# Patient Record
Sex: FEMALE | Race: OTHER | HISPANIC OR LATINO
[De-identification: names, ages, dates, MRNs, and addresses within clinical notes are randomized per-mention and may not be internally consistent; named-entity substitution may affect disease eponyms.]

---

## 2021-12-03 PROBLEM — Z00.00 ENCOUNTER FOR PREVENTIVE HEALTH EXAMINATION: Status: ACTIVE | Noted: 2021-12-03

## 2021-12-07 ENCOUNTER — APPOINTMENT (OUTPATIENT)
Dept: NEUROLOGY | Facility: CLINIC | Age: 76
End: 2021-12-07
Payer: MEDICARE

## 2021-12-07 VITALS
WEIGHT: 137 LBS | HEIGHT: 64 IN | DIASTOLIC BLOOD PRESSURE: 60 MMHG | SYSTOLIC BLOOD PRESSURE: 190 MMHG | BODY MASS INDEX: 23.39 KG/M2

## 2021-12-07 DIAGNOSIS — Z86.79 PERSONAL HISTORY OF OTHER DISEASES OF THE CIRCULATORY SYSTEM: ICD-10-CM

## 2021-12-07 DIAGNOSIS — Z72.89 OTHER PROBLEMS RELATED TO LIFESTYLE: ICD-10-CM

## 2021-12-07 DIAGNOSIS — Z86.39 PERSONAL HISTORY OF OTHER ENDOCRINE, NUTRITIONAL AND METABOLIC DISEASE: ICD-10-CM

## 2021-12-07 PROCEDURE — 99204 OFFICE O/P NEW MOD 45 MIN: CPT

## 2021-12-07 RX ORDER — QUINAPRIL HYDROCHLORIDE 20 MG/1
20 TABLET, FILM COATED ORAL
Refills: 0 | Status: COMPLETED | COMMUNITY

## 2021-12-07 RX ORDER — ATORVASTATIN CALCIUM 10 MG/1
10 TABLET, FILM COATED ORAL
Refills: 0 | Status: COMPLETED | COMMUNITY

## 2021-12-07 RX ORDER — ATORVASTATIN CALCIUM 10 MG/1
10 TABLET, FILM COATED ORAL
Refills: 0 | Status: ACTIVE | COMMUNITY

## 2021-12-07 RX ORDER — QUINAPRIL HYDROCHLORIDE 10 MG/1
10 TABLET, FILM COATED ORAL
Refills: 0 | Status: ACTIVE | COMMUNITY

## 2021-12-07 NOTE — CONSULT LETTER
[Dear  ___] : Dear  [unfilled], [Courtesy Letter:] : I had the pleasure of seeing your patient, [unfilled], in my office today. [Please see my note below.] : Please see my note below. [Consult Closing:] : Thank you very much for allowing me to participate in the care of this patient.  If you have any questions, please do not hesitate to contact me. [Sincerely,] : Sincerely, [FreeTextEntry3] : Drew Amaral MD.

## 2021-12-07 NOTE — HISTORY OF PRESENT ILLNESS
[FreeTextEntry1] : I saw this patient in the office today.\par She presents with her .\par \par On routine physical examination done by a visiting nurse, a cognitive screen demonstrated some memory difficulty. She was advised to follow with her primary doctor and then was referred here.\par Her  reports that he first noticed some cognitive difficulties in February of 2021.\par It is predominantly for short-term memory.

## 2021-12-07 NOTE — PHYSICAL EXAM
[General Appearance - Alert] : alert [General Appearance - In No Acute Distress] : in no acute distress [Affect] : the affect was normal [Memory Remote] : remote memory was not impaired [Cranial Nerves Optic (II)] : visual acuity intact bilaterally,  visual fields full to confrontation, pupils equal round and reactive to light [Cranial Nerves Oculomotor (III)] : extraocular motion intact [Cranial Nerves Trigeminal (V)] : facial sensation intact symmetrically [Cranial Nerves Facial (VII)] : face symmetrical [Cranial Nerves Vestibulocochlear (VIII)] : hearing was intact bilaterally [Cranial Nerves Glossopharyngeal (IX)] : tongue and palate midline [Cranial Nerves Accessory (XI - Cranial And Spinal)] : head turning and shoulder shrug symmetric [Cranial Nerves Hypoglossal (XII)] : there was no tongue deviation with protrusion [Motor Tone] : muscle tone was normal in all four extremities [Motor Strength] : muscle strength was normal in all four extremities [Sensation Tactile Decrease] : light touch was intact [Sensation Pain / Temperature Decrease] : pain and temperature was intact [Sensation Vibration Decrease] : vibration was intact [Abnormal Walk] : normal gait [2+] : Ankle jerk left 2+ [Optic Disc Abnormality] : the optic disc were normal in size and color [Total Score ___ / 30] : the patient achieved a score of [unfilled] /30 [Date / Time ___ / 5] : date / time [unfilled] / 5 [Place ___ / 5] : place [unfilled] / 5 [Registration ___ / 3] : registration [unfilled] / 3 [Serial Sevens ___/5] : serial sevens [unfilled] / 5 [Naming 2 Objects ___ / 2] : naming two objects [unfilled] / 2 [Repeating a Sentence ___ / 1] : repeating a sentence [unfilled] / 1 [Writing a Sentence ___ / 1] : write sentence [unfilled] / 1 [3-stage Verbal Command ___ / 3] : three-stage verbal command [unfilled] / 3 [Written Command ___ / 1] : written command [unfilled] / 1 [Copy a Design ___ / 1] : copy a design [unfilled] / 1 [Recall ___ / 3] : recall [unfilled] / 3 [Dysarthria] : no dysarthria [Aphasia] : no dysphasia/aphasia [Romberg's Sign] : Romberg's sign was negtive [Coordination - Dysmetria Impaired Finger-to-Nose Bilateral] : not present [Plantar Reflex Right Only] : normal on the right [Plantar Reflex Left Only] : normal on the left

## 2021-12-07 NOTE — ASSESSMENT
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

## 2021-12-09 ENCOUNTER — APPOINTMENT (OUTPATIENT)
Dept: NEUROLOGY | Facility: CLINIC | Age: 76
End: 2021-12-09
Payer: MEDICARE

## 2021-12-09 ENCOUNTER — NON-APPOINTMENT (OUTPATIENT)
Age: 76
End: 2021-12-09

## 2021-12-09 PROCEDURE — 95819 EEG AWAKE AND ASLEEP: CPT

## 2021-12-09 PROCEDURE — 93040 RHYTHM ECG WITH REPORT: CPT

## 2021-12-21 ENCOUNTER — APPOINTMENT (OUTPATIENT)
Dept: MRI IMAGING | Facility: CLINIC | Age: 76
End: 2021-12-21
Payer: MEDICARE

## 2021-12-21 ENCOUNTER — NON-APPOINTMENT (OUTPATIENT)
Age: 76
End: 2021-12-21

## 2021-12-21 ENCOUNTER — OUTPATIENT (OUTPATIENT)
Dept: OUTPATIENT SERVICES | Facility: HOSPITAL | Age: 76
LOS: 1 days | End: 2021-12-21

## 2021-12-21 DIAGNOSIS — G30.9 ALZHEIMER'S DISEASE, UNSPECIFIED: ICD-10-CM

## 2021-12-21 PROCEDURE — 70551 MRI BRAIN STEM W/O DYE: CPT | Mod: 26

## 2022-01-01 ENCOUNTER — EMERGENCY (EMERGENCY)
Facility: HOSPITAL | Age: 77
LOS: 1 days | End: 2022-01-01
Attending: EMERGENCY MEDICINE
Payer: MEDICARE

## 2022-01-01 VITALS
RESPIRATION RATE: 20 BRPM | OXYGEN SATURATION: 100 % | SYSTOLIC BLOOD PRESSURE: 170 MMHG | HEART RATE: 77 BPM | TEMPERATURE: 98 F | DIASTOLIC BLOOD PRESSURE: 90 MMHG

## 2022-01-01 DIAGNOSIS — R42 DIZZINESS AND GIDDINESS: ICD-10-CM

## 2022-01-01 DIAGNOSIS — I10 ESSENTIAL (PRIMARY) HYPERTENSION: ICD-10-CM

## 2022-01-01 DIAGNOSIS — I16.0 HYPERTENSIVE URGENCY: ICD-10-CM

## 2022-01-01 LAB
ALBUMIN SERPL ELPH-MCNC: 4.2 G/DL — SIGNIFICANT CHANGE UP (ref 3.3–5.2)
ALP SERPL-CCNC: 98 U/L — SIGNIFICANT CHANGE UP (ref 40–120)
ALT FLD-CCNC: 38 U/L — HIGH
ANION GAP SERPL CALC-SCNC: 23 MMOL/L — HIGH (ref 5–17)
APPEARANCE UR: CLEAR — SIGNIFICANT CHANGE UP
AST SERPL-CCNC: 40 U/L — HIGH
BASE EXCESS BLDV CALC-SCNC: 0.4 MMOL/L — SIGNIFICANT CHANGE UP (ref -2–3)
BASOPHILS # BLD AUTO: 0.08 K/UL — SIGNIFICANT CHANGE UP (ref 0–0.2)
BASOPHILS NFR BLD AUTO: 0.9 % — SIGNIFICANT CHANGE UP (ref 0–2)
BILIRUB SERPL-MCNC: 0.6 MG/DL — SIGNIFICANT CHANGE UP (ref 0.4–2)
BILIRUB UR-MCNC: NEGATIVE — SIGNIFICANT CHANGE UP
BUN SERPL-MCNC: 11 MG/DL — SIGNIFICANT CHANGE UP (ref 8–20)
CA-I SERPL-SCNC: 1.07 MMOL/L — LOW (ref 1.15–1.33)
CALCIUM SERPL-MCNC: 9.1 MG/DL — SIGNIFICANT CHANGE UP (ref 8.6–10.2)
CHLORIDE BLDV-SCNC: 104 MMOL/L — SIGNIFICANT CHANGE UP (ref 98–107)
CHLORIDE SERPL-SCNC: 101 MMOL/L — SIGNIFICANT CHANGE UP (ref 98–107)
CO2 SERPL-SCNC: 17 MMOL/L — LOW (ref 22–29)
COLOR SPEC: YELLOW — SIGNIFICANT CHANGE UP
CREAT SERPL-MCNC: 0.75 MG/DL — SIGNIFICANT CHANGE UP (ref 0.5–1.3)
DIFF PNL FLD: NEGATIVE — SIGNIFICANT CHANGE UP
EOSINOPHIL # BLD AUTO: 0.01 K/UL — SIGNIFICANT CHANGE UP (ref 0–0.5)
EOSINOPHIL NFR BLD AUTO: 0.1 % — SIGNIFICANT CHANGE UP (ref 0–6)
FLUAV AG NPH QL: SIGNIFICANT CHANGE UP
FLUBV AG NPH QL: SIGNIFICANT CHANGE UP
GAS PNL BLDV: 138 MMOL/L — SIGNIFICANT CHANGE UP (ref 136–145)
GAS PNL BLDV: SIGNIFICANT CHANGE UP
GLUCOSE BLDV-MCNC: 122 MG/DL — HIGH (ref 70–99)
GLUCOSE SERPL-MCNC: 148 MG/DL — HIGH (ref 70–99)
GLUCOSE UR QL: NEGATIVE MG/DL — SIGNIFICANT CHANGE UP
HCO3 BLDV-SCNC: 24 MMOL/L — SIGNIFICANT CHANGE UP (ref 22–29)
HCT VFR BLD CALC: 43.3 % — SIGNIFICANT CHANGE UP (ref 34.5–45)
HCT VFR BLDA CALC: 42 % — SIGNIFICANT CHANGE UP (ref 34–46)
HGB BLD CALC-MCNC: 14.1 G/DL — SIGNIFICANT CHANGE UP (ref 11.7–16.1)
HGB BLD-MCNC: 14.7 G/DL — SIGNIFICANT CHANGE UP (ref 11.5–15.5)
IMM GRANULOCYTES NFR BLD AUTO: 0.6 % — SIGNIFICANT CHANGE UP (ref 0–1.5)
KETONES UR-MCNC: NEGATIVE — SIGNIFICANT CHANGE UP
LACTATE BLDV-MCNC: 4.5 MMOL/L — CRITICAL HIGH (ref 0.5–2)
LACTATE BLDV-MCNC: 4.9 MMOL/L — CRITICAL HIGH (ref 0.5–2)
LEUKOCYTE ESTERASE UR-ACNC: NEGATIVE — SIGNIFICANT CHANGE UP
LIDOCAIN IGE QN: 18 U/L — LOW (ref 22–51)
LYMPHOCYTES # BLD AUTO: 0.77 K/UL — LOW (ref 1–3.3)
LYMPHOCYTES # BLD AUTO: 9.1 % — LOW (ref 13–44)
MCHC RBC-ENTMCNC: 31.1 PG — SIGNIFICANT CHANGE UP (ref 27–34)
MCHC RBC-ENTMCNC: 33.9 GM/DL — SIGNIFICANT CHANGE UP (ref 32–36)
MCV RBC AUTO: 91.7 FL — SIGNIFICANT CHANGE UP (ref 80–100)
MONOCYTES # BLD AUTO: 0.27 K/UL — SIGNIFICANT CHANGE UP (ref 0–0.9)
MONOCYTES NFR BLD AUTO: 3.2 % — SIGNIFICANT CHANGE UP (ref 2–14)
NEUTROPHILS # BLD AUTO: 7.26 K/UL — SIGNIFICANT CHANGE UP (ref 1.8–7.4)
NEUTROPHILS NFR BLD AUTO: 86.1 % — HIGH (ref 43–77)
NITRITE UR-MCNC: NEGATIVE — SIGNIFICANT CHANGE UP
PCO2 BLDV: 34 MMHG — LOW (ref 39–42)
PH BLDV: 7.46 — HIGH (ref 7.32–7.43)
PH UR: 8 — SIGNIFICANT CHANGE UP (ref 5–8)
PLATELET # BLD AUTO: 311 K/UL — SIGNIFICANT CHANGE UP (ref 150–400)
PO2 BLDV: 102 MMHG — HIGH (ref 25–45)
POTASSIUM BLDV-SCNC: 3.7 MMOL/L — SIGNIFICANT CHANGE UP (ref 3.5–5.1)
POTASSIUM SERPL-MCNC: 3.6 MMOL/L — SIGNIFICANT CHANGE UP (ref 3.5–5.3)
POTASSIUM SERPL-SCNC: 3.6 MMOL/L — SIGNIFICANT CHANGE UP (ref 3.5–5.3)
PROT SERPL-MCNC: 7.5 G/DL — SIGNIFICANT CHANGE UP (ref 6.6–8.7)
PROT UR-MCNC: NEGATIVE — SIGNIFICANT CHANGE UP
RBC # BLD: 4.72 M/UL — SIGNIFICANT CHANGE UP (ref 3.8–5.2)
RBC # FLD: 12.7 % — SIGNIFICANT CHANGE UP (ref 10.3–14.5)
RSV RNA NPH QL NAA+NON-PROBE: SIGNIFICANT CHANGE UP
SAO2 % BLDV: 99.3 % — SIGNIFICANT CHANGE UP
SARS-COV-2 RNA SPEC QL NAA+PROBE: SIGNIFICANT CHANGE UP
SODIUM SERPL-SCNC: 141 MMOL/L — SIGNIFICANT CHANGE UP (ref 135–145)
SP GR SPEC: 1.01 — SIGNIFICANT CHANGE UP (ref 1.01–1.02)
TROPONIN T SERPL-MCNC: <0.01 NG/ML — SIGNIFICANT CHANGE UP (ref 0–0.06)
UROBILINOGEN FLD QL: NEGATIVE MG/DL — SIGNIFICANT CHANGE UP
WBC # BLD: 8.44 K/UL — SIGNIFICANT CHANGE UP (ref 3.8–10.5)
WBC # FLD AUTO: 8.44 K/UL — SIGNIFICANT CHANGE UP (ref 3.8–10.5)

## 2022-01-01 PROCEDURE — 99285 EMERGENCY DEPT VISIT HI MDM: CPT

## 2022-01-01 PROCEDURE — 70496 CT ANGIOGRAPHY HEAD: CPT | Mod: 26,MA

## 2022-01-01 PROCEDURE — 70498 CT ANGIOGRAPHY NECK: CPT | Mod: 26,MA

## 2022-01-01 PROCEDURE — 93010 ELECTROCARDIOGRAM REPORT: CPT

## 2022-01-01 PROCEDURE — 71045 X-RAY EXAM CHEST 1 VIEW: CPT | Mod: 26

## 2022-01-01 RX ORDER — DIAZEPAM 5 MG
5 TABLET ORAL ONCE
Refills: 0 | Status: DISCONTINUED | OUTPATIENT
Start: 2022-01-01 | End: 2022-01-01

## 2022-01-01 RX ORDER — METOCLOPRAMIDE HCL 10 MG
10 TABLET ORAL ONCE
Refills: 0 | Status: COMPLETED | OUTPATIENT
Start: 2022-01-01 | End: 2022-01-01

## 2022-01-01 RX ORDER — LABETALOL HCL 100 MG
10 TABLET ORAL ONCE
Refills: 0 | Status: COMPLETED | OUTPATIENT
Start: 2022-01-01 | End: 2022-01-01

## 2022-01-01 RX ORDER — SODIUM CHLORIDE 9 MG/ML
1000 INJECTION INTRAMUSCULAR; INTRAVENOUS; SUBCUTANEOUS ONCE
Refills: 0 | Status: COMPLETED | OUTPATIENT
Start: 2022-01-01 | End: 2022-01-01

## 2022-01-01 RX ORDER — MECLIZINE HCL 12.5 MG
25 TABLET ORAL ONCE
Refills: 0 | Status: COMPLETED | OUTPATIENT
Start: 2022-01-01 | End: 2022-01-01

## 2022-01-01 RX ORDER — ACETAMINOPHEN 500 MG
1000 TABLET ORAL ONCE
Refills: 0 | Status: COMPLETED | OUTPATIENT
Start: 2022-01-01 | End: 2022-01-01

## 2022-01-01 RX ORDER — SODIUM CHLORIDE 9 MG/ML
1000 INJECTION, SOLUTION INTRAVENOUS ONCE
Refills: 0 | Status: COMPLETED | OUTPATIENT
Start: 2022-01-01 | End: 2022-01-01

## 2022-01-01 RX ORDER — ONDANSETRON 8 MG/1
4 TABLET, FILM COATED ORAL ONCE
Refills: 0 | Status: COMPLETED | OUTPATIENT
Start: 2022-01-01 | End: 2022-01-01

## 2022-01-01 RX ADMIN — Medication 5 MILLIGRAM(S): at 20:43

## 2022-01-01 RX ADMIN — Medication 25 MILLIGRAM(S): at 18:21

## 2022-01-01 RX ADMIN — SODIUM CHLORIDE 1000 MILLILITER(S): 9 INJECTION INTRAMUSCULAR; INTRAVENOUS; SUBCUTANEOUS at 17:11

## 2022-01-01 RX ADMIN — SODIUM CHLORIDE 1000 MILLILITER(S): 9 INJECTION INTRAMUSCULAR; INTRAVENOUS; SUBCUTANEOUS at 15:38

## 2022-01-01 RX ADMIN — ONDANSETRON 4 MILLIGRAM(S): 8 TABLET, FILM COATED ORAL at 15:38

## 2022-01-01 RX ADMIN — SODIUM CHLORIDE 1000 MILLILITER(S): 9 INJECTION, SOLUTION INTRAVENOUS at 18:58

## 2022-01-01 RX ADMIN — SODIUM CHLORIDE 1000 MILLILITER(S): 9 INJECTION INTRAMUSCULAR; INTRAVENOUS; SUBCUTANEOUS at 20:43

## 2022-01-01 RX ADMIN — SODIUM CHLORIDE 1000 MILLILITER(S): 9 INJECTION INTRAMUSCULAR; INTRAVENOUS; SUBCUTANEOUS at 17:09

## 2022-01-01 RX ADMIN — Medication 10 MILLIGRAM(S): at 18:21

## 2022-01-01 RX ADMIN — Medication 5 MILLIGRAM(S): at 15:38

## 2022-01-01 RX ADMIN — Medication 400 MILLIGRAM(S): at 20:43

## 2022-01-01 RX ADMIN — Medication 10 MILLIGRAM(S): at 21:05

## 2022-01-01 NOTE — ED PROVIDER NOTE - NS ED ROS FT
Const: Denies fever, chills  HEENT: Denies blurry vision, sore throat  Neck: Denies neck pain/stiffness  Resp: Denies coughing, SOB  Cardiovascular: Denies CP, palpitations, LE edema  GI: Denies nausea, vomiting, abdominal pain, diarrhea, constipation, blood in stool  : Denies urinary frequency/urgency/dysuria, hematuria  MSK: Denies back pain  Neuro: + HA, + dizziness. Denies numbness, weakness  Skin: Denies rashes.

## 2022-01-01 NOTE — ED PROVIDER NOTE - OBJECTIVE STATEMENT
77 y/o F with PMH HTN presents with sudden onset vertigo, nausea while walking to her kitchen sometime between  breakfast and lunch, that resolved when sitting down. Now complains of a headache. She denies chest pain, SOB (was placed on 4L NC, but denies SOB and satting 100% on room air), vision changes, vomiting. Her symptoms worsen when looking to the left. She did not fall. She states she panicked because nothing like this has ever happened to her before. She takes blood pressure medication at night and took it last night. She has otherwise been feeling well recently. She denies smoking, drinks alcohol "sometimes" but not recently, denies illicit drugs. Denies allergies to medications.

## 2022-01-01 NOTE — ED PROVIDER NOTE - PHYSICAL EXAMINATION
Const: Awake, alert and oriented. In no acute distress. Well appearing. Patient vomited after looking to the left.  HEENT: NC/AT. Moist mucous membranes.  Eyes: No scleral icterus. EOMI.  Neck:. Soft and supple. Full ROM without pain.  Cardiac: Regular rate and regular rhythm. +S1/S2. No murmurs. Peripheral pulses 2+ and symmetric. No LE edema.  Resp: Speaking in full sentences. No evidence of respiratory distress. No wheezes, rales or rhonchi.  Abd: Soft, non-tender, non-distended. Normal bowel sounds in all 4 quadrants. No guarding or rebound.  Back: Spine midline and non-tender. No CVAT.  Skin: No rashes, abrasions or lacerations.  Neuro: CN II-XII grossly in tact. Symmetrical smile. PERRL. EOMI. Horizontal nystagmus, worse when looking left. Bilateral and symmetric sensation of face. Tongue midline. Normal rapid alternating movements. No pronator drift. Sensation symmetrically intact bilateral upper and lower extremities. Reflexes 2+ bilaterally. Babinski negative bilaterally.

## 2022-01-01 NOTE — ED PROVIDER NOTE - PROGRESS NOTE DETAILS
C[place]: Patient reassessed, feels much better, pending CT. : CT chest/ abdomen/ pelvis showed no acute finding. lactate trending down. patient sat up and report feeing dizzy again. She had a MRI head 3 weeks ago due to memory loss. patient is agreeable to stay as MRI.

## 2022-01-01 NOTE — ED ADULT TRIAGE NOTE - CHIEF COMPLAINT QUOTE
Pt BIBA c/o sob, nausea, headache since earlier today.  Denies cp. Pt BIBA c/o sob, nausea, headache since earlier today.  Denies cp.  Pt on 4LNC on arrival.

## 2022-01-01 NOTE — ED PROVIDER NOTE - CLINICAL SUMMARY MEDICAL DECISION MAKING FREE TEXT BOX
75 y/o F with PMH HTN presents for sudden onset vertigo, exacerbated when looking to the left, non-focal neuro exam - Priority CT called - patient not hypoxic on room air - no SOB, anti-emetics and IV valium for vertigo - fluids, will reassess.

## 2022-01-02 VITALS — SYSTOLIC BLOOD PRESSURE: 176 MMHG | HEART RATE: 75 BPM | DIASTOLIC BLOOD PRESSURE: 74 MMHG

## 2022-01-02 LAB
ANION GAP SERPL CALC-SCNC: 15 MMOL/L — SIGNIFICANT CHANGE UP (ref 5–17)
BUN SERPL-MCNC: 9.3 MG/DL — SIGNIFICANT CHANGE UP (ref 8–20)
CALCIUM SERPL-MCNC: 8.7 MG/DL — SIGNIFICANT CHANGE UP (ref 8.6–10.2)
CHLORIDE SERPL-SCNC: 106 MMOL/L — SIGNIFICANT CHANGE UP (ref 98–107)
CO2 SERPL-SCNC: 21 MMOL/L — LOW (ref 22–29)
CREAT SERPL-MCNC: 0.69 MG/DL — SIGNIFICANT CHANGE UP (ref 0.5–1.3)
GLUCOSE SERPL-MCNC: 117 MG/DL — HIGH (ref 70–99)
LACTATE BLDV-MCNC: 3.3 MMOL/L — HIGH (ref 0.5–2)
LACTATE BLDV-MCNC: 3.5 MMOL/L — HIGH (ref 0.5–2)
POTASSIUM SERPL-MCNC: 3.4 MMOL/L — LOW (ref 3.5–5.3)
POTASSIUM SERPL-SCNC: 3.4 MMOL/L — LOW (ref 3.5–5.3)
SODIUM SERPL-SCNC: 142 MMOL/L — SIGNIFICANT CHANGE UP (ref 135–145)

## 2022-01-02 PROCEDURE — 87637 SARSCOV2&INF A&B&RSV AMP PRB: CPT

## 2022-01-02 PROCEDURE — 71045 X-RAY EXAM CHEST 1 VIEW: CPT

## 2022-01-02 PROCEDURE — 85025 COMPLETE CBC W/AUTO DIFF WBC: CPT

## 2022-01-02 PROCEDURE — 71260 CT THORAX DX C+: CPT | Mod: 26,MA

## 2022-01-02 PROCEDURE — 70498 CT ANGIOGRAPHY NECK: CPT | Mod: MA

## 2022-01-02 PROCEDURE — 96374 THER/PROPH/DIAG INJ IV PUSH: CPT | Mod: XU

## 2022-01-02 PROCEDURE — 97167 OT EVAL HIGH COMPLEX 60 MIN: CPT

## 2022-01-02 PROCEDURE — 70450 CT HEAD/BRAIN W/O DYE: CPT | Mod: MA

## 2022-01-02 PROCEDURE — 83605 ASSAY OF LACTIC ACID: CPT

## 2022-01-02 PROCEDURE — 96375 TX/PRO/DX INJ NEW DRUG ADDON: CPT | Mod: XU

## 2022-01-02 PROCEDURE — G0378: CPT

## 2022-01-02 PROCEDURE — 85014 HEMATOCRIT: CPT

## 2022-01-02 PROCEDURE — 83690 ASSAY OF LIPASE: CPT

## 2022-01-02 PROCEDURE — 80053 COMPREHEN METABOLIC PANEL: CPT

## 2022-01-02 PROCEDURE — 82330 ASSAY OF CALCIUM: CPT

## 2022-01-02 PROCEDURE — 85018 HEMOGLOBIN: CPT

## 2022-01-02 PROCEDURE — 99285 EMERGENCY DEPT VISIT HI MDM: CPT | Mod: 25

## 2022-01-02 PROCEDURE — 74177 CT ABD & PELVIS W/CONTRAST: CPT | Mod: 26,MA

## 2022-01-02 PROCEDURE — 70551 MRI BRAIN STEM W/O DYE: CPT | Mod: 26,MA

## 2022-01-02 PROCEDURE — 82803 BLOOD GASES ANY COMBINATION: CPT

## 2022-01-02 PROCEDURE — 96376 TX/PRO/DX INJ SAME DRUG ADON: CPT | Mod: XU

## 2022-01-02 PROCEDURE — 84484 ASSAY OF TROPONIN QUANT: CPT

## 2022-01-02 PROCEDURE — 82435 ASSAY OF BLOOD CHLORIDE: CPT

## 2022-01-02 PROCEDURE — 83880 ASSAY OF NATRIURETIC PEPTIDE: CPT

## 2022-01-02 PROCEDURE — 74177 CT ABD & PELVIS W/CONTRAST: CPT | Mod: MA

## 2022-01-02 PROCEDURE — 93005 ELECTROCARDIOGRAM TRACING: CPT

## 2022-01-02 PROCEDURE — 82947 ASSAY GLUCOSE BLOOD QUANT: CPT

## 2022-01-02 PROCEDURE — 80048 BASIC METABOLIC PNL TOTAL CA: CPT

## 2022-01-02 PROCEDURE — 36415 COLL VENOUS BLD VENIPUNCTURE: CPT

## 2022-01-02 PROCEDURE — 84295 ASSAY OF SERUM SODIUM: CPT

## 2022-01-02 PROCEDURE — 87086 URINE CULTURE/COLONY COUNT: CPT

## 2022-01-02 PROCEDURE — 70496 CT ANGIOGRAPHY HEAD: CPT | Mod: MA

## 2022-01-02 PROCEDURE — 99236 HOSP IP/OBS SAME DATE HI 85: CPT

## 2022-01-02 PROCEDURE — 70551 MRI BRAIN STEM W/O DYE: CPT | Mod: MA

## 2022-01-02 PROCEDURE — 96361 HYDRATE IV INFUSION ADD-ON: CPT

## 2022-01-02 PROCEDURE — 71260 CT THORAX DX C+: CPT | Mod: MA

## 2022-01-02 PROCEDURE — 81003 URINALYSIS AUTO W/O SCOPE: CPT

## 2022-01-02 PROCEDURE — 84132 ASSAY OF SERUM POTASSIUM: CPT

## 2022-01-02 RX ORDER — MECLIZINE HCL 12.5 MG
25 TABLET ORAL EVERY 6 HOURS
Refills: 0 | Status: DISCONTINUED | OUTPATIENT
Start: 2022-01-02 | End: 2022-01-06

## 2022-01-02 RX ORDER — MECLIZINE HCL 12.5 MG
1 TABLET ORAL
Qty: 9 | Refills: 0
Start: 2022-01-02 | End: 2022-01-04

## 2022-01-02 RX ORDER — ASPIRIN/CALCIUM CARB/MAGNESIUM 324 MG
325 TABLET ORAL DAILY
Refills: 0 | Status: DISCONTINUED | OUTPATIENT
Start: 2022-01-02 | End: 2022-01-06

## 2022-01-02 RX ORDER — LISINOPRIL 2.5 MG/1
40 TABLET ORAL DAILY
Refills: 0 | Status: DISCONTINUED | OUTPATIENT
Start: 2022-01-02 | End: 2022-01-06

## 2022-01-02 RX ORDER — DIAZEPAM 5 MG
5 TABLET ORAL THREE TIMES A DAY
Refills: 0 | Status: DISCONTINUED | OUTPATIENT
Start: 2022-01-02 | End: 2022-01-02

## 2022-01-02 RX ORDER — ACETAMINOPHEN 500 MG
650 TABLET ORAL ONCE
Refills: 0 | Status: COMPLETED | OUTPATIENT
Start: 2022-01-02 | End: 2022-01-02

## 2022-01-02 RX ADMIN — Medication 325 MILLIGRAM(S): at 11:26

## 2022-01-02 RX ADMIN — LISINOPRIL 40 MILLIGRAM(S): 2.5 TABLET ORAL at 09:41

## 2022-01-02 RX ADMIN — Medication 650 MILLIGRAM(S): at 14:20

## 2022-01-02 RX ADMIN — Medication 5 MILLIGRAM(S): at 12:39

## 2022-01-02 RX ADMIN — Medication 25 MILLIGRAM(S): at 09:41

## 2022-01-02 NOTE — ED CDU PROVIDER DISPOSITION NOTE - CARE PROVIDERS DIRECT ADDRESSES
[email or web address]

## 2022-01-02 NOTE — PHYSICAL THERAPY INITIAL EVALUATION ADULT - ADDITIONAL COMMENTS
pt reports she lives in a house with 4 steps to enter with one handrail + 12 steps upstairs with handrail. pt reports independence with all ADLs and no assistive device needed. pt does not drive,  drives and is home to assist as needed. No DME at home.

## 2022-01-02 NOTE — OCCUPATIONAL THERAPY INITIAL EVALUATION ADULT - LEVEL OF INDEPENDENCE: SIT/STAND, REHAB EVAL
pt c/o lightheadedness with transfer, symptoms decrease with standing rest break and deep breathing/contact guard

## 2022-01-02 NOTE — PHYSICAL THERAPY INITIAL EVALUATION ADULT - PHYSICAL ASSIST/NONPHYSICAL ASSIST: SUPINE/SIT, REHAB EVAL
pt reports dizziness is still present but has greatly improved since arriving to ED./1 person assist

## 2022-01-02 NOTE — OCCUPATIONAL THERAPY INITIAL EVALUATION ADULT - DIAGNOSIS, OT EVAL
76 year old Female presenting with dizziness, positional vertigo. In ED CT head, CTA head/neck (-). MRI (-)

## 2022-01-02 NOTE — PROVIDER CONTACT NOTE (OTHER) - ASSESSMENT
PT orders received, chart reviewed & contents noted. CASEY aguilera'ed pt to participate w/ PT. PT eval completed & documented, please see for details. pt received semi herring in bed, A&OX4, NAD & willing to participate w/ PT. pt reports no pain pre/post PT. pt amb, performed transfers and stair training. pt required verbal cues to increase safety awareness, poor dynamic balance noted during PT. pt reports dizziness t/o PT but significant improvement since ED admission. pt left semi herring in bed w/ all lines intact, CBWR & NAD. pt instructed to not get up w/o assistance, reports understanding. RN aware. Case discussed with ARTEMIO Logan.  pt education: goals and role of PT, safety, use of call bell, benefits of OOB mobility, transfers, RW, stair training and energy conservation.
PT orders received, chart reviewed and contents noted. As per ARTEMIO Logan, pt is ok to be seen pending MRI. pt is currently off the unit for testing. PT will return as schedule permits. Thank you.
As per PT /OT eval pt. needs walker and shower chair .
Chart reviewed, contents noted. CASEY Vicente ok pt for OT eval, pt agreed, eval done-see note for details. Received pt semifowler on stretcher, NAD, +IV lock, +on RA. Pre/post eval pain 0/10. Pt c/o dizziness throughout eval but states improvement from admission. Safety/precautions maintained. Pt requires verbal cues to increase safety awareness with all mobility and tasks. Pt with poor dynamic balance noted with ADLs, functional transfers. Left pt as found, NAD, lines intact, needs met, CBWR. Instructed pt to call for assist-pt verbalized understanding and agreement. Handoff to CASEY Vicente. Case discussed with ARTEMIO Logan.  Patient education: Safe transfer techniques, safe functional mobility, visual scanning techniques, weight shifting techniques, energy conservation techniques and use of call bell

## 2022-01-02 NOTE — OCCUPATIONAL THERAPY INITIAL EVALUATION ADULT - LIVES WITH, PROFILE
pt reports she lives in a private house with her  who is available to assist. 4 steps to enter with handrail and 12 steps upstairs with handrail to bed/bath./spouse

## 2022-01-02 NOTE — ED CDU PROVIDER DISPOSITION NOTE - CLINICAL COURSE
77 y/o F PMHx HTN presented to ED c/o vertigo, onset while walking to her kitchen sometime between breakfast and lunch, that resolved when sitting down. She did not fall. In ED ct head, ct angio head/neck negative. initially had elevated lactate which has downtrended with fluid. Was feeling improved but still complains of dizziness when she stands or walks. MRI no acute stroke. Pt also hypertensive, pt did not take home meds for 2 days, home meds resumed here with improvement in BP. Pt seen by PT / OT who recommended rolling walker, shower chair and outpatient PT. Follow up outpatient for incidental findings. Pt requests I discuss results /plan with  via telephone, I spoke to her  Chetan regarding all above

## 2022-01-02 NOTE — PHYSICAL THERAPY INITIAL EVALUATION ADULT - DISCHARGE DISPOSITION, PT EVAL
home with 24/7 assist if family is willing and able to and outpatient PT to improve dynamic balance.

## 2022-01-02 NOTE — PROVIDER CONTACT NOTE (OTHER) - RECOMMENDATIONS
Home with 24/7 assist if family is able and willing. Recommend RW and shower chair. Pt would benefit from outpatient balance PT/vestibular therapy

## 2022-01-02 NOTE — PHYSICAL THERAPY INITIAL EVALUATION ADULT - LEVEL OF INDEPENDENCE: SIT/STAND, REHAB EVAL
pt performed transfer x 2 from stretcher and toilet height. pt reports lightheadedness with transfer, symptoms decrease with standing rest break and deep breathing exercises./contact guard

## 2022-01-02 NOTE — OCCUPATIONAL THERAPY INITIAL EVALUATION ADULT - PLANNED THERAPY INTERVENTIONS, OT EVAL
ADL retraining/balance training/motor coordination training/neuromuscular re-education/ROM/strengthening/transfer training

## 2022-01-02 NOTE — PROVIDER CONTACT NOTE (OTHER) - SITUATION
CCC spoke to pt's spouse , he informed the writer that pt. is IPTA prior to the admission.
Chart reviewed, contents noted. OT eval not performed at this time as pt off unit at MR. OT will reattempt as schedule permits and pt medical condition allows.

## 2022-01-02 NOTE — ED CDU PROVIDER INITIAL DAY NOTE - MEDICAL DECISION MAKING DETAILS
77yo F presenting with vertigo, positional. CT head, CTA head/neck negative. Obs for MRI to eval for cva.

## 2022-01-02 NOTE — ED CDU PROVIDER INITIAL DAY NOTE - PROGRESS NOTE DETAILS
Pt assessed at bedside with Dr. Andrew, pt reports some persistent dizziness. Pt on anti hypertensive at home, order placed. Pt denies headache or chest pain     Awaiting MRI ARTEMIO Junior NOTE: MRI no acute stroke, PT saw patient and recommended rolling walker, shower chair and outpatient PT, pt lives with  who can assist her. Pt BP elevated but improved from yesterday, pt did not take her BP medicine past 2 days at home, she has BP cuff to monitor, advised  Pt stable for d/c, reports improvement, VSS, tolerating PO, ambulatory.  Discussion includes results, plan, proper medication use/side effects, and return precautions. Pt advised to f/u with PMD 1-2 days and specialists discussed.  Printed copies of available lab/radiology results contained within discharge packet. Pt verbalized understanding/agreement of plan.

## 2022-01-02 NOTE — ED ADULT NURSE REASSESSMENT NOTE - COMFORT CARE
plan of care explained/repositioned/warm blanket provided
new purewick applied/plan of care explained/repositioned/side rails up/warm blanket provided

## 2022-01-02 NOTE — OCCUPATIONAL THERAPY INITIAL EVALUATION ADULT - PERSONAL SAFETY AND JUDGMENT, REHAB EVAL
pt slightly impulsive, required cues for safety awareness and obstacle negotiation with RW/impaired/at risk behaviors demonstrated

## 2022-01-02 NOTE — PROVIDER CONTACT NOTE (OTHER) - ACTION/TREATMENT ORDERED:
PT Goals( to achieve in 1 weeks);Pt independent with bed mobility. Pt mod. independent with transfers with RW.  Pt mod I with amb with RW X 300 feet
Walker will be dispensed to the pt. in ER , out pt. therapy script will be given to pt. by PA.     Pt's spouse will be picking her up , no other d/c barriers noted/reported.

## 2022-01-02 NOTE — ED CDU PROVIDER INITIAL DAY NOTE - OBJECTIVE STATEMENT
77yo F pmhx HTN presented to ED c/o vertigo, onset while walking to her kitchen sometime between breakfast and lunch, that resolved when sitting down. She did not fall. She states she panicked because nothing like this has ever happened to her before. She has otherwise been feeling well recently. In ED ct head, ct angio head/neck negative. initially had elevated lactate which has downtrended with fluid. Was feeling improved but still complains of dizziness when she stands or walks. She denies smoking, drinks alcohol "sometimes" but not recently, denies illicit drugs. Denies allergies to medications. Denies slurred speech, weakness, LOC, vomiting, cp, sob.

## 2022-01-02 NOTE — ED CDU PROVIDER INITIAL DAY NOTE - PRO INTERPRETER NEED 2
-- DO NOT REPLY / DO NOT REPLY ALL --  -- Message is from the [facility]--    *PATIENT REQUESTING TO HAVE SAME MEDICATION BUT GENERIC THAT'S COST EFFECTIVE    Patient is requesting a medication refill - medication is on active list    Was Medication Pended? Yes.    Rx Name and Dose:  halobetasol (ULTRAVATE) 0.05 % ointment    Duration: 30 days    Pharmacy  [id number] [place], [place] - [address] [facility]    Patient confirmed the above pharmacy as correct?  Yes    Caller Information       Type Contact Phone    10/12/2021 12:27 PM CDT Phone (Incoming) Rob Burkett (Self) [phone] (M)          Alternative phone number:     Turnaround time given to caller:   \"This message will be sent to [state Provider's name]. The clinical team will fulfill your request as soon as they review your message.\"   English

## 2022-01-02 NOTE — ED CDU PROVIDER DISPOSITION NOTE - CARE PROVIDER_API CALL
Souleymane Fountain)  Gastroenterology; Internal Medicine  [address]  [place] [address]  Phone: [phone]  Fax: [phone]  Follow Up Time: 7-10 Days    Andre Mckeon; PhD)  Neurology; Vascular Neurology  [address], [address]  [place] [address]  Phone: [phone]  Fax: [phone]  Follow Up Time: 1-3 Days    Javi Reyes)  Otolaryngology  [facility], [address]  [place] [address]  Phone: [phone]  Fax: [phone]  Follow Up Time: 1-3 Days    Freddy Roach)  Critical Care Medicine; Internal Medicine; Pulmonary Disease; Sleep Medicine  [address]  [place] [address]  Phone: [phone]  Fax: [phone]  Follow Up Time: 7-10 Days

## 2022-01-02 NOTE — PROVIDER CONTACT NOTE (OTHER) - BACKGROUND
76 year old Female with PMH HTN presents with sudden onset vertigo, nausea while walking to her kitchen sometime between  breakfast and lunch, that resolved when sitting down
75 y/o F with PMH HTN presents with sudden onset vertigo, nausea while walking to her kitchen sometime between  breakfast and lunch, that resolved when sitting down.
Pt. does not have any DME.

## 2022-01-02 NOTE — ED CDU PROVIDER INITIAL DAY NOTE - NS ED ROS FT
Const: Denies fever, chills  HEENT: Denies blurry vision, sore throat  Neck: Denies neck pain/stiffness  Resp: Denies coughing, SOB  Cardiovascular: Denies CP, palpitations, LE edema  GI: Denies nausea, vomiting, abdominal pain, diarrhea, constipation, blood in stool  : Denies urinary frequency/urgency/dysuria, hematuria  MSK: Denies back pain  Neuro: + dizziness. Denies numbness, weakness  Skin: Denies rashes.

## 2022-01-02 NOTE — PROVIDER CONTACT NOTE (OTHER) - RECOMMENDATIONS
D/C recommendations: home with 24/7 assist if family is willing and able to, RW, shower chair and outpatient PT to improve dynamic balance.

## 2022-01-02 NOTE — PHYSICAL THERAPY INITIAL EVALUATION ADULT - PERTINENT HX OF CURRENT PROBLEM, REHAB EVAL
75 y/o F with PMH HTN presents with sudden onset vertigo, nausea while walking to her kitchen sometime between  breakfast and lunch, that resolved when sitting down. Now complains of a headache.

## 2022-01-02 NOTE — ED CDU PROVIDER DISPOSITION NOTE - ATTENDING CONTRIBUTION TO CARE
I, Sukhjinder Andrew, performed a face to face bedside interview with this patient regarding history of present illness, review of symptoms and relevant past medical, social and family history.  I completed an independent physical examination. I have communicated the patient’s plan of care and disposition with the ACP.  Pt improved, mri neg, ambulatory  Gen: NAD, well appearing  CV: RRR  Pul: CTA b/l  Abd: Soft, non-distended, non-tender  Neuro: no focal deficits  Pt improved, stable for dc

## 2022-01-02 NOTE — OCCUPATIONAL THERAPY INITIAL EVALUATION ADULT - VISUAL ACUITY
No complaints in vision offered. Pt able to correctly identify # of digits held in central and peripheral fields bilaterally

## 2022-01-02 NOTE — PHYSICAL THERAPY INITIAL EVALUATION ADULT - GAIT DISTANCE, PT EVAL
pt reports feeling unsteady with ambulation. Noted veering right and left with ambulation and LOB. pt used stepping strategies and RW to regain balance./100 feet

## 2022-01-02 NOTE — PHYSICAL THERAPY INITIAL EVALUATION ADULT - PERSONAL SAFETY AND JUDGMENT, REHAB EVAL
verbal cues required to increase safety awareness and proper use of RW around obstacles./at risk behaviors demonstrated

## 2022-01-02 NOTE — OCCUPATIONAL THERAPY INITIAL EVALUATION ADULT - LEVEL OF INDEPENDENCE: DRESS LOWER BODY, OT EVAL
seated EOB to don/doff socks. Pt requires CGA for standing components of ADL due to decreased dynamic balance/supervision/contact guard

## 2022-01-02 NOTE — OCCUPATIONAL THERAPY INITIAL EVALUATION ADULT - IMPAIRMENTS CONTRIBUTING IMPAIRED BED MOBILITY, REHAB EVAL
+dizzy with position changes. Pt reports symptoms have improved since arriving at ED/impaired balance/decreased strength

## 2022-01-02 NOTE — OCCUPATIONAL THERAPY INITIAL EVALUATION ADULT - PERTINENT HX OF CURRENT PROBLEM, REHAB EVAL
Pt with sudden onset vertigo, nausea while walking to her kitchen sometime between  breakfast and lunch, that resolved when sitting down. Pt now c/o headache

## 2022-01-02 NOTE — OCCUPATIONAL THERAPY INITIAL EVALUATION ADULT - ADDITIONAL COMMENTS
Tub with doors and grab bar. pt reports independence with all ADLs and no assistive device needed. pt does not drive,  drives. No DME at home. Right handed.

## 2022-01-02 NOTE — ED CDU PROVIDER DISPOSITION NOTE - PROVIDER TOKENS
PROVIDER:[TOKEN:[[id number]],FOLLOWUP:[7-10 Days]],PROVIDER:[TOKEN:[[id number]],FOLLOWUP:[1-3 Days]],PROVIDER:[TOKEN:[[id number]],FOLLOWUP:[1-3 Days]],PROVIDER:[TOKEN:[[id number]],FOLLOWUP:[7-10 Days]]

## 2022-01-02 NOTE — ED CDU PROVIDER DISPOSITION NOTE - PATIENT PORTAL LINK FT
You can access the FollowMyHealth Patient Portal offered by [facility] by registering at the following website: [email or web address]. By joining [facility]’s FollowMyHealth portal, you will also be able to view your health information using other applications (apps) compatible with our system.

## 2022-01-02 NOTE — ED CDU PROVIDER INITIAL DAY NOTE - ATTENDING CONTRIBUTION TO CARE
I, Ziyad Cortes, have personally performed a face to face diagnostic evaluation on this patient. I have reviewed the ACP note and agree with the history, exam and plan of care, except as noted.    75 yo F hx of HTN p/w room spinning sensation, worse with head movement. CT head, CTA head and neck negative for acute pathology. elevated lactate that improved after IVF. CT chest/abd/pelvis showed no acute finding. multiple meds given for vertigo. still had symptoms when getting up. patient placed in Obs for MRI.

## 2022-01-02 NOTE — ED ADULT NURSE REASSESSMENT NOTE - NS ED NURSE REASSESS COMMENT FT1
pt remains AOX3, COLE with strength and purpose, attempted to ambulate alone but was too dizzy. pt needed assistance to restroom. meal tray given, no other complaints, medicated fror dizziness as ordered, awaiting PT.
pt still dizzy more meds given to tx repeat lactate sent and urine
Assumed care of pt at 19:15 as stated in report from CASEY Yo. Charting as noted. Patient A&O x3, denies pain/discomfort, denies CP/SOB. Updated on the plan of care. Call bell within reach, bed locked in lowest position. IV site flushed w/ NS. No redness, swelling or pain noted to site. No signs of acute distress noted, safety maintained. Pt remains on CM in NSR. Pt hypertensive, MD Villafana called to bedside, medications administered as per orders.
care assumed from CASEY De Jesus. patient AAO x 4. on obs for dizziness. neuro intact. patient cleaned, repositioned, new linens, new purewick applied. patient changed in to a gown. all questions answered. bed in lowest position, call bell within reach. awaiting MRI and PT. will ctm

## 2022-01-02 NOTE — ED CDU PROVIDER DISPOSITION NOTE - NSFOLLOWUPINSTRUCTIONS_ED_ALL_ED_FT
- Please follow up with your Primary Care Doctor in 1 - 2 days. If you cannot follow-up with your primary care doctor please return to the Emergency Department for any urgent issues.  - Seek immediate medical care for any new, worsening or concerning signs or symptoms.   - Take medications as directed, be sure to read all instructions on packaging  - You were given copies of all your test results, please bring to your primary care doctor for review of any abnormal test results  - Follow up with Neurologist and ENT for dizziness  - Follow up with gastroenterologist for liver cyst   - Follow up with pulmonologist for calcified granuloma     - If you have difficulty following up, please call: [phone] or go to [email or web address] to obtain a [facility] doctor or specialist who takes your insurance in your area.    Feel better!     Dizziness    WHAT YOU NEED TO KNOW:    Dizziness is a feeling of being off balance or unsteady. Common causes of dizziness are an inner ear fluid imbalance or a lack of oxygen in your blood. Dizziness may be acute (lasts 3 days or less) or chronic (lasts longer than 3 days). You may have dizzy spells that last from seconds to a few hours.     DISCHARGE INSTRUCTIONS:    Return to the emergency department if:   •You have a headache and a stiff neck.      •You have shaking chills and a fever.       •You vomit over and over with no relief.       •Your vomit or bowel movements are red or black.       •You have pain in your chest, back, or abdomen.       •You have numbness, especially in your face, arms, or legs.       •You have trouble moving your arms or legs.       •You are confused.       Contact your healthcare provider if:   •You have a fever.       •Your symptoms do not get better with treatment.       •You have questions or concerns about your condition or care.       Manage your symptoms:   •Do not drive or operate heavy machinery when you are dizzy.       •Get up slowly from sitting or lying down.       •Drink plenty of liquids. Liquids help prevent dehydration. Ask how much liquid to drink each day and which liquids are best for you.      Follow up with your doctor as directed: Write down your questions so you remember to ask them during your visits.     - Your blood pressure reading was high while in ED, please monitor your blood pressure at home and follow up with your Primary Doctor    Hypertension, commonly called high blood pressure, is when the force of blood pumping through your arteries is too strong. Hypertension forces your heart to work harder to pump blood. Your arteries may become narrow or stiff. Having untreated or uncontrolled hypertension for a long period of time can cause heart attack, stroke, kidney disease, and other problems. If started on a medication, take exactly as prescribed by your health care professional.     SEEK IMMEDIATE MEDICAL CARE IF YOU HAVE ANY OF THE FOLLOWING SYMPTOMS: severe headache, changes in your vision, confusion, chest pain, abdominal pain, vomiting, or shortness of breath.

## 2022-01-03 LAB
CULTURE RESULTS: SIGNIFICANT CHANGE UP
SPECIMEN SOURCE: SIGNIFICANT CHANGE UP

## 2022-01-04 ENCOUNTER — NON-APPOINTMENT (OUTPATIENT)
Age: 77
End: 2022-01-04

## 2022-01-26 ENCOUNTER — NON-APPOINTMENT (OUTPATIENT)
Age: 77
End: 2022-01-26

## 2022-01-27 ENCOUNTER — RX RENEWAL (OUTPATIENT)
Age: 77
End: 2022-01-27

## 2022-03-08 PROBLEM — I10 ESSENTIAL (PRIMARY) HYPERTENSION: Chronic | Status: ACTIVE | Noted: 2022-01-01

## 2022-03-09 ENCOUNTER — APPOINTMENT (OUTPATIENT)
Dept: NEUROLOGY | Facility: CLINIC | Age: 77
End: 2022-03-09

## 2022-03-21 ENCOUNTER — APPOINTMENT (OUTPATIENT)
Dept: NEUROLOGY | Facility: CLINIC | Age: 77
End: 2022-03-21
Payer: MEDICARE

## 2022-03-21 VITALS
HEIGHT: 64 IN | DIASTOLIC BLOOD PRESSURE: 98 MMHG | SYSTOLIC BLOOD PRESSURE: 160 MMHG | WEIGHT: 135 LBS | BODY MASS INDEX: 23.05 KG/M2

## 2022-03-21 PROCEDURE — 99214 OFFICE O/P EST MOD 30 MIN: CPT

## 2022-03-21 NOTE — PHYSICAL EXAM
[General Appearance - Alert] : alert [General Appearance - In No Acute Distress] : in no acute distress [Affect] : the affect was normal [Memory Remote] : remote memory was not impaired [Recall ___ / 3] : recall [unfilled] / 3 [Cranial Nerves Optic (II)] : visual acuity intact bilaterally,  visual fields full to confrontation, pupils equal round and reactive to light [Cranial Nerves Oculomotor (III)] : extraocular motion intact [Cranial Nerves Trigeminal (V)] : facial sensation intact symmetrically [Cranial Nerves Facial (VII)] : face symmetrical [Cranial Nerves Vestibulocochlear (VIII)] : hearing was intact bilaterally [Cranial Nerves Glossopharyngeal (IX)] : tongue and palate midline [Cranial Nerves Accessory (XI - Cranial And Spinal)] : head turning and shoulder shrug symmetric [Cranial Nerves Hypoglossal (XII)] : there was no tongue deviation with protrusion [Motor Tone] : muscle tone was normal in all four extremities [Motor Strength] : muscle strength was normal in all four extremities [Sensation Tactile Decrease] : light touch was intact [Sensation Pain / Temperature Decrease] : pain and temperature was intact [Sensation Vibration Decrease] : vibration was intact [Abnormal Walk] : normal gait [2+] : Ankle jerk left 2+ [Optic Disc Abnormality] : the optic disc were normal in size and color [Dysarthria] : no dysarthria [Aphasia] : no dysphasia/aphasia [Romberg's Sign] : Romberg's sign was negtive [Coordination - Dysmetria Impaired Finger-to-Nose Bilateral] : not present [Plantar Reflex Right Only] : normal on the right [Plantar Reflex Left Only] : normal on the left

## 2022-03-21 NOTE — DATA REVIEWED
[de-identified] : Brain MRI was performed on 12/21/2021.\par I reviewed the images.\par The study was unremarkable.\par There were chronic ischemic changes. [de-identified] : EEG was normal. [de-identified] : B12 level was 682\par Thyroid function was normal.

## 2022-03-21 NOTE — ASSESSMENT
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

## 2022-09-21 ENCOUNTER — APPOINTMENT (OUTPATIENT)
Dept: NEUROLOGY | Facility: CLINIC | Age: 77
End: 2022-09-21

## 2022-09-21 VITALS
DIASTOLIC BLOOD PRESSURE: 100 MMHG | BODY MASS INDEX: 23.39 KG/M2 | HEIGHT: 64 IN | WEIGHT: 137 LBS | SYSTOLIC BLOOD PRESSURE: 180 MMHG

## 2022-09-21 PROCEDURE — 99213 OFFICE O/P EST LOW 20 MIN: CPT

## 2022-09-21 NOTE — DATA REVIEWED
[de-identified] : Brain MRI was performed on 12/21/2021.\par I reviewed the images.\par The study was unremarkable.\par There were chronic ischemic changes. [de-identified] : EEG was normal. [de-identified] : B12 level was 682\par Thyroid function was normal.

## 2022-09-21 NOTE — HISTORY OF PRESENT ILLNESS
[FreeTextEntry1] : I saw this patient in the office today.\par She presents with her .\par \par As you recall, on routine physical examination done by a visiting nurse, a cognitive screen demonstrated some memory difficulty. She was advised to follow with her primary doctor and then was referred here.\par Her  reports that he first noticed some cognitive difficulties in February of 2021.\par It is predominantly for short-term memory.\par \par I had started her on memantine.

## 2022-09-21 NOTE — ASSESSMENT
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

## 2023-01-19 ENCOUNTER — OFFICE (OUTPATIENT)
Dept: URBAN - METROPOLITAN AREA CLINIC 115 | Facility: CLINIC | Age: 78
Setting detail: OPHTHALMOLOGY
End: 2023-01-19
Payer: MEDICARE

## 2023-01-19 DIAGNOSIS — H35.40: ICD-10-CM

## 2023-01-19 DIAGNOSIS — H25.13: ICD-10-CM

## 2023-01-19 DIAGNOSIS — H35.033: ICD-10-CM

## 2023-01-19 DIAGNOSIS — H43.813: ICD-10-CM

## 2023-01-19 PROCEDURE — 92004 COMPRE OPH EXAM NEW PT 1/>: CPT | Performed by: OPTOMETRIST

## 2023-01-19 PROCEDURE — 92250 FUNDUS PHOTOGRAPHY W/I&R: CPT | Performed by: OPTOMETRIST

## 2023-01-19 ASSESSMENT — REFRACTION_MANIFEST
OD_CYLINDER: -1.00
OD_SPHERE: -2.00
OS_VA1: 20/30-
OS_CYLINDER: -1.00
OS_AXIS: 100
OD_AXIS: 095
OD_VA1: 20/30
OS_SPHERE: -2.25

## 2023-01-19 ASSESSMENT — TONOMETRY
OS_IOP_MMHG: 20
OD_IOP_MMHG: 19

## 2023-01-19 ASSESSMENT — SPHEQUIV_DERIVED
OS_SPHEQUIV: -4.125
OS_SPHEQUIV: -2.75
OD_SPHEQUIV: -2.5
OD_SPHEQUIV: -3.875

## 2023-01-19 ASSESSMENT — REFRACTION_CURRENTRX
OS_SPHERE: -1.75
OS_OVR_VA: 20/
OS_CYLINDER: -0.75
OD_OVR_VA: 20/
OD_AXIS: 102
OD_VPRISM_DIRECTION: SV
OS_AXIS: 095
OS_VPRISM_DIRECTION: SV
OD_CYLINDER: -0.75
OD_SPHERE: -1.00

## 2023-01-19 ASSESSMENT — REFRACTION_AUTOREFRACTION
OD_CYLINDER: -0.75
OS_AXIS: 126
OD_AXIS: 105
OS_CYLINDER: -0.75
OD_SPHERE: -3.50
OS_SPHERE: -3.75

## 2023-01-19 ASSESSMENT — VISUAL ACUITY
OD_BCVA: 20/40-
OS_BCVA: 20/40

## 2023-01-19 ASSESSMENT — CONFRONTATIONAL VISUAL FIELD TEST (CVF)
OS_FINDINGS: FULL
OD_FINDINGS: FULL

## 2023-04-10 ENCOUNTER — APPOINTMENT (OUTPATIENT)
Dept: NEUROLOGY | Facility: CLINIC | Age: 78
End: 2023-04-10
Payer: MEDICARE

## 2023-04-10 VITALS
HEIGHT: 64 IN | DIASTOLIC BLOOD PRESSURE: 78 MMHG | SYSTOLIC BLOOD PRESSURE: 140 MMHG | BODY MASS INDEX: 23.05 KG/M2 | WEIGHT: 135 LBS

## 2023-04-10 DIAGNOSIS — Z86.39 PERSONAL HISTORY OF OTHER ENDOCRINE, NUTRITIONAL AND METABOLIC DISEASE: ICD-10-CM

## 2023-04-10 PROCEDURE — 99213 OFFICE O/P EST LOW 20 MIN: CPT

## 2023-04-10 RX ORDER — LISINOPRIL 10 MG/1
10 TABLET ORAL
Qty: 90 | Refills: 0 | Status: ACTIVE | COMMUNITY
Start: 2023-04-04

## 2023-04-10 RX ORDER — METOPROLOL SUCCINATE 50 MG/1
50 TABLET, EXTENDED RELEASE ORAL
Qty: 90 | Refills: 0 | Status: ACTIVE | COMMUNITY
Start: 2022-03-04

## 2023-04-10 RX ORDER — LEVOTHYROXINE SODIUM 0.05 MG/1
50 TABLET ORAL
Qty: 90 | Refills: 0 | Status: ACTIVE | COMMUNITY
Start: 2023-03-10

## 2023-04-10 RX ORDER — METOPROLOL TARTRATE 50 MG/1
50 TABLET, FILM COATED ORAL
Refills: 0 | Status: COMPLETED | COMMUNITY
End: 2023-04-10

## 2023-04-10 NOTE — HISTORY OF PRESENT ILLNESS
[FreeTextEntry1] : I saw this patient in the office today.\par She presents with her .\par \par As you recall, on routine physical examination done by a visiting nurse, a cognitive screen demonstrated some memory difficulty. She was advised to follow with her primary doctor and then was referred here.\par Her  reports that he first noticed some cognitive difficulties in February of 2021.\par It is predominantly for short-term memory.\par \par I had started her on memantine.\par \par 4/10/23 visit:\par She has remained stable since her last visit.\par She denies any new symptoms.

## 2023-04-10 NOTE — ASSESSMENT
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

## 2023-04-10 NOTE — DATA REVIEWED
[de-identified] : Brain MRI was performed on 12/21/2021.\par I reviewed the images.\par The study was unremarkable.\par There were chronic ischemic changes. [de-identified] : EEG was normal. [de-identified] : B12 level was 682\par Thyroid function was normal.

## 2023-08-02 ENCOUNTER — OFFICE (OUTPATIENT)
Dept: URBAN - METROPOLITAN AREA CLINIC 115 | Facility: CLINIC | Age: 78
Setting detail: OPHTHALMOLOGY
End: 2023-08-02
Payer: MEDICARE

## 2023-08-02 DIAGNOSIS — H35.40: ICD-10-CM

## 2023-08-02 DIAGNOSIS — H35.033: ICD-10-CM

## 2023-08-02 DIAGNOSIS — H25.13: ICD-10-CM

## 2023-08-02 DIAGNOSIS — H43.813: ICD-10-CM

## 2023-08-02 PROCEDURE — 92250 FUNDUS PHOTOGRAPHY W/I&R: CPT | Performed by: OPTOMETRIST

## 2023-08-02 PROCEDURE — 92014 COMPRE OPH EXAM EST PT 1/>: CPT | Performed by: OPTOMETRIST

## 2023-08-02 ASSESSMENT — TONOMETRY: OS_IOP_MMHG: 20

## 2023-08-02 ASSESSMENT — REFRACTION_CURRENTRX
OD_CYLINDER: -0.75
OD_AXIS: 102
OS_CYLINDER: -0.75
OS_SPHERE: -1.75
OS_AXIS: 095
OS_VPRISM_DIRECTION: SV
OD_VPRISM_DIRECTION: SV
OS_OVR_VA: 20/
OD_SPHERE: -1.00
OD_OVR_VA: 20/

## 2023-08-02 ASSESSMENT — VISUAL ACUITY
OD_BCVA: 20/80
OS_BCVA: 20/80-1

## 2023-08-02 ASSESSMENT — REFRACTION_AUTOREFRACTION
OD_SPHERE: UTP
OS_SPHERE: -3.75
OS_CYLINDER: -0.50
OS_AXIS: 146

## 2023-08-02 ASSESSMENT — SPHEQUIV_DERIVED
OD_SPHEQUIV: -2.5
OS_SPHEQUIV: -2.75
OS_SPHEQUIV: -4

## 2023-08-02 ASSESSMENT — REFRACTION_MANIFEST
OD_AXIS: 095
OD_SPHERE: -2.00
OS_CYLINDER: -1.00
OD_CYLINDER: -1.00
OS_VA1: 20/30-
OS_AXIS: 100
OD_VA1: 20/30
OS_SPHERE: -2.25

## 2023-08-02 ASSESSMENT — CONFRONTATIONAL VISUAL FIELD TEST (CVF)
OD_FINDINGS: FULL
OS_FINDINGS: FULL

## 2023-09-12 ENCOUNTER — OFFICE (OUTPATIENT)
Dept: URBAN - METROPOLITAN AREA CLINIC 115 | Facility: CLINIC | Age: 78
Setting detail: OPHTHALMOLOGY
End: 2023-09-12
Payer: MEDICARE

## 2023-09-12 DIAGNOSIS — H35.033: ICD-10-CM

## 2023-09-12 DIAGNOSIS — H35.40: ICD-10-CM

## 2023-09-12 DIAGNOSIS — H25.13: ICD-10-CM

## 2023-09-12 DIAGNOSIS — H43.813: ICD-10-CM

## 2023-09-12 DIAGNOSIS — H25.11: ICD-10-CM

## 2023-09-12 PROCEDURE — 99214 OFFICE O/P EST MOD 30 MIN: CPT | Performed by: OPHTHALMOLOGY

## 2023-09-12 PROCEDURE — 92136 OPHTHALMIC BIOMETRY: CPT | Performed by: OPHTHALMOLOGY

## 2023-09-12 ASSESSMENT — SPHEQUIV_DERIVED: OS_SPHEQUIV: -4.625

## 2023-09-12 ASSESSMENT — REFRACTION_CURRENTRX
OS_VPRISM_DIRECTION: SV
OS_CYLINDER: -0.75
OS_SPHERE: -1.75
OS_OVR_VA: 20/
OD_SPHERE: -1.00
OD_OVR_VA: 20/
OD_AXIS: 102
OD_CYLINDER: -0.75
OS_AXIS: 095
OD_VPRISM_DIRECTION: SV

## 2023-09-12 ASSESSMENT — REFRACTION_AUTOREFRACTION
OS_SPHERE: -4.25
OS_AXIS: 124
OD_SPHERE: UTP
OS_CYLINDER: -0.75

## 2023-09-12 ASSESSMENT — VISUAL ACUITY
OD_BCVA: 20/60-1
OS_BCVA: 20/150

## 2023-09-12 ASSESSMENT — TONOMETRY
OS_IOP_MMHG: 15
OD_IOP_MMHG: 16

## 2023-09-12 ASSESSMENT — CONFRONTATIONAL VISUAL FIELD TEST (CVF)
OS_FINDINGS: FULL
OD_FINDINGS: FULL

## 2023-09-29 ENCOUNTER — RX RENEWAL (OUTPATIENT)
Age: 78
End: 2023-09-29

## 2023-10-16 ENCOUNTER — APPOINTMENT (OUTPATIENT)
Dept: NEUROLOGY | Facility: CLINIC | Age: 78
End: 2023-10-16
Payer: MEDICARE

## 2023-10-16 VITALS
HEIGHT: 64 IN | DIASTOLIC BLOOD PRESSURE: 90 MMHG | WEIGHT: 135 LBS | BODY MASS INDEX: 23.05 KG/M2 | SYSTOLIC BLOOD PRESSURE: 188 MMHG

## 2023-10-16 PROCEDURE — 99213 OFFICE O/P EST LOW 20 MIN: CPT

## 2023-12-01 ENCOUNTER — RX ONLY (RX ONLY)
Age: 78
End: 2023-12-01

## 2023-12-01 ENCOUNTER — OFFICE (OUTPATIENT)
Dept: URBAN - METROPOLITAN AREA CLINIC 115 | Facility: CLINIC | Age: 78
Setting detail: OPHTHALMOLOGY
End: 2023-12-01
Payer: MEDICARE

## 2023-12-01 DIAGNOSIS — Z96.1: ICD-10-CM

## 2023-12-01 PROCEDURE — 99024 POSTOP FOLLOW-UP VISIT: CPT | Performed by: OPTOMETRIST

## 2023-12-01 ASSESSMENT — CORNEAL EDEMA - FOLDS/STRIAE: OD_FOLDSSTRIAE: 1+

## 2023-12-01 ASSESSMENT — REFRACTION_CURRENTRX
OS_AXIS: 095
OD_SPHERE: -1.00
OD_CYLINDER: -0.75
OS_SPHERE: -1.75
OD_AXIS: 102
OD_OVR_VA: 20/
OS_OVR_VA: 20/
OD_VPRISM_DIRECTION: SV
OS_VPRISM_DIRECTION: SV
OS_CYLINDER: -0.75

## 2023-12-01 ASSESSMENT — REFRACTION_AUTOREFRACTION
OS_AXIS: 118
OS_CYLINDER: -0.75
OD_SPHERE: +1.00
OD_AXIS: 147
OD_CYLINDER: -0.50
OS_SPHERE: -4.25

## 2023-12-01 ASSESSMENT — CONFRONTATIONAL VISUAL FIELD TEST (CVF)
OS_FINDINGS: FULL
OD_FINDINGS: FULL

## 2023-12-01 ASSESSMENT — SPHEQUIV_DERIVED
OD_SPHEQUIV: 0.75
OS_SPHEQUIV: -4.625

## 2023-12-07 ENCOUNTER — OFFICE (OUTPATIENT)
Dept: URBAN - METROPOLITAN AREA CLINIC 115 | Facility: CLINIC | Age: 78
Setting detail: OPHTHALMOLOGY
End: 2023-12-07
Payer: MEDICARE

## 2023-12-07 DIAGNOSIS — Z96.1: ICD-10-CM

## 2023-12-07 DIAGNOSIS — H25.12: ICD-10-CM

## 2023-12-07 PROCEDURE — 99024 POSTOP FOLLOW-UP VISIT: CPT | Performed by: OPHTHALMOLOGY

## 2023-12-07 PROCEDURE — 92136 OPHTHALMIC BIOMETRY: CPT | Mod: LT | Performed by: OPHTHALMOLOGY

## 2023-12-07 ASSESSMENT — REFRACTION_CURRENTRX
OD_AXIS: 102
OS_SPHERE: -1.75
OD_SPHERE: -1.00
OS_AXIS: 095
OS_CYLINDER: -0.75
OD_OVR_VA: 20/
OS_OVR_VA: 20/
OS_VPRISM_DIRECTION: SV
OD_CYLINDER: -0.75
OD_VPRISM_DIRECTION: SV

## 2023-12-07 ASSESSMENT — REFRACTION_AUTOREFRACTION
OD_CYLINDER: -0.25
OS_AXIS: 106
OS_SPHERE: -4.25
OD_AXIS: 152
OD_SPHERE: +0.50
OS_CYLINDER: -1.00

## 2023-12-07 ASSESSMENT — CONFRONTATIONAL VISUAL FIELD TEST (CVF)
OS_FINDINGS: FULL
OD_FINDINGS: FULL

## 2023-12-07 ASSESSMENT — SPHEQUIV_DERIVED
OS_SPHEQUIV: -4.75
OD_SPHEQUIV: 0.375

## 2023-12-14 ENCOUNTER — ASC (OUTPATIENT)
Dept: URBAN - METROPOLITAN AREA SURGERY 8 | Facility: SURGERY | Age: 78
Setting detail: OPHTHALMOLOGY
End: 2023-12-14
Payer: MEDICARE

## 2023-12-14 DIAGNOSIS — H25.12: ICD-10-CM

## 2023-12-14 PROCEDURE — 66984 XCAPSL CTRC RMVL W/O ECP: CPT | Mod: 79,LT | Performed by: OPHTHALMOLOGY

## 2023-12-15 ENCOUNTER — OFFICE (OUTPATIENT)
Dept: URBAN - METROPOLITAN AREA CLINIC 115 | Facility: CLINIC | Age: 78
Setting detail: OPHTHALMOLOGY
End: 2023-12-15
Payer: MEDICARE

## 2023-12-15 ENCOUNTER — RX ONLY (RX ONLY)
Age: 78
End: 2023-12-15

## 2023-12-15 DIAGNOSIS — Z96.1: ICD-10-CM

## 2023-12-15 PROBLEM — H25.12 CATARACT SENILE NUCLEAR SCLEROSIS;  , LEFT EYE: Status: RESOLVED | Noted: 2023-12-01 | Resolved: 2023-12-15

## 2023-12-15 PROCEDURE — 99024 POSTOP FOLLOW-UP VISIT: CPT | Performed by: OPTOMETRIST

## 2023-12-15 ASSESSMENT — REFRACTION_CURRENTRX
OS_AXIS: 095
OS_VPRISM_DIRECTION: SV
OS_CYLINDER: -0.75
OD_OVR_VA: 20/
OD_AXIS: 102
OD_CYLINDER: -0.75
OS_OVR_VA: 20/
OS_SPHERE: -1.75
OD_VPRISM_DIRECTION: SV
OD_SPHERE: -1.00

## 2023-12-15 ASSESSMENT — REFRACTION_AUTOREFRACTION
OD_CYLINDER: -0.25
OS_AXIS: 107
OS_CYLINDER: -1.75
OD_SPHERE: +0.50
OD_AXIS: 096
OS_SPHERE: +1.00

## 2023-12-15 ASSESSMENT — SPHEQUIV_DERIVED
OS_SPHEQUIV: 0.125
OD_SPHEQUIV: 0.375

## 2023-12-15 ASSESSMENT — CORNEAL EDEMA - FOLDS/STRIAE: OS_FOLDSSTRIAE: T

## 2023-12-22 ENCOUNTER — OFFICE (OUTPATIENT)
Dept: URBAN - METROPOLITAN AREA CLINIC 115 | Facility: CLINIC | Age: 78
Setting detail: OPHTHALMOLOGY
End: 2023-12-22
Payer: MEDICARE

## 2023-12-22 DIAGNOSIS — Z96.1: ICD-10-CM

## 2023-12-22 PROCEDURE — 99024 POSTOP FOLLOW-UP VISIT: CPT | Performed by: OPTOMETRIST

## 2023-12-22 ASSESSMENT — REFRACTION_CURRENTRX
OD_CYLINDER: -0.75
OS_OVR_VA: 20/
OS_AXIS: 095
OD_VPRISM_DIRECTION: SV
OS_VPRISM_DIRECTION: SV
OD_OVR_VA: 20/
OD_AXIS: 102
OS_CYLINDER: -0.75
OD_SPHERE: -1.00
OS_SPHERE: -1.75

## 2023-12-22 ASSESSMENT — REFRACTION_AUTOREFRACTION
OD_AXIS: 128
OD_CYLINDER: -0.25
OD_SPHERE: +0.25
OS_AXIS: 092
OS_CYLINDER: -0.75
OS_SPHERE: +0.75

## 2023-12-22 ASSESSMENT — SPHEQUIV_DERIVED
OD_SPHEQUIV: 0.125
OS_SPHEQUIV: 0.375

## 2023-12-22 ASSESSMENT — CONFRONTATIONAL VISUAL FIELD TEST (CVF)
OS_FINDINGS: FULL
OD_FINDINGS: FULL

## 2024-01-10 ENCOUNTER — OFFICE (OUTPATIENT)
Dept: URBAN - METROPOLITAN AREA CLINIC 115 | Facility: CLINIC | Age: 79
Setting detail: OPHTHALMOLOGY
End: 2024-01-10
Payer: MEDICARE

## 2024-01-10 DIAGNOSIS — Z96.1: ICD-10-CM

## 2024-01-10 DIAGNOSIS — H16.223: ICD-10-CM

## 2024-01-10 PROCEDURE — 99024 POSTOP FOLLOW-UP VISIT: CPT | Performed by: OPTOMETRIST

## 2024-01-10 ASSESSMENT — REFRACTION_CURRENTRX
OD_VPRISM_DIRECTION: SV
OS_AXIS: 095
OS_OVR_VA: 20/
OS_VPRISM_DIRECTION: SV
OS_CYLINDER: -0.75
OS_SPHERE: -1.75
OD_AXIS: 102
OD_CYLINDER: -0.75
OD_OVR_VA: 20/
OD_SPHERE: -1.00

## 2024-01-10 ASSESSMENT — REFRACTION_AUTOREFRACTION
OS_CYLINDER: -1.00
OS_SPHERE: +0.50
OD_AXIS: 000
OS_AXIS: 110
OD_CYLINDER: SPH
OD_SPHERE: +0.50

## 2024-01-10 ASSESSMENT — SPHEQUIV_DERIVED
OS_SPHEQUIV: 0
OS_SPHEQUIV: 0
OD_SPHEQUIV: 0.25

## 2024-01-10 ASSESSMENT — REFRACTION_MANIFEST
OD_VA1: 20/20
OS_SPHERE: +0.25
OD_AXIS: 000
OS_VA1: 20/20
OS_CYLINDER: -0.50
OD_CYLINDER: 0.00
OS_AXIS: 110
OD_SPHERE: +0.25

## 2024-01-10 ASSESSMENT — CONFRONTATIONAL VISUAL FIELD TEST (CVF)
OD_FINDINGS: FULL
OS_FINDINGS: FULL

## 2024-01-10 ASSESSMENT — SUPERFICIAL PUNCTATE KERATITIS (SPK)
OS_SPK: T
OD_SPK: T

## 2024-04-16 ENCOUNTER — APPOINTMENT (OUTPATIENT)
Dept: NEUROLOGY | Facility: CLINIC | Age: 79
End: 2024-04-16
Payer: MEDICARE

## 2024-04-16 ENCOUNTER — OFFICE (OUTPATIENT)
Dept: URBAN - METROPOLITAN AREA CLINIC 115 | Facility: CLINIC | Age: 79
Setting detail: OPHTHALMOLOGY
End: 2024-04-16
Payer: MEDICARE

## 2024-04-16 VITALS
DIASTOLIC BLOOD PRESSURE: 100 MMHG | SYSTOLIC BLOOD PRESSURE: 190 MMHG | HEIGHT: 64 IN | WEIGHT: 135 LBS | BODY MASS INDEX: 23.05 KG/M2

## 2024-04-16 DIAGNOSIS — G30.9 ALZHEIMER'S DISEASE, UNSPECIFIED: ICD-10-CM

## 2024-04-16 DIAGNOSIS — H16.223: ICD-10-CM

## 2024-04-16 DIAGNOSIS — Z96.1: ICD-10-CM

## 2024-04-16 DIAGNOSIS — F02.80 ALZHEIMER'S DISEASE, UNSPECIFIED: ICD-10-CM

## 2024-04-16 PROCEDURE — 99213 OFFICE O/P EST LOW 20 MIN: CPT

## 2024-04-16 PROCEDURE — 92012 INTRM OPH EXAM EST PATIENT: CPT | Performed by: OPHTHALMOLOGY

## 2024-04-16 PROCEDURE — G2211 COMPLEX E/M VISIT ADD ON: CPT

## 2024-04-16 NOTE — DATA REVIEWED
[de-identified] : Brain MRI was performed on 12/21/2021.\par  I reviewed the images.\par  The study was unremarkable.\par  There were chronic ischemic changes. [de-identified] : B12 level was 682\par  Thyroid function was normal. [de-identified] : EEG was normal.

## 2024-04-16 NOTE — ASSESSMENT
[FreeTextEntry1] : This is an 79-year-old woman who was found incidentally to have some cognitive impairment. 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 Her work-up for secondary causes of cognitive impairment has been unremarkable.  I will continue memantine.  Blood pressure was elevated in my office today. I advised her to discuss this with you further.  I will see her back in 6 months.

## 2024-04-16 NOTE — PHYSICAL EXAM
[General Appearance - Alert] : alert [General Appearance - In No Acute Distress] : in no acute distress [Affect] : the affect was normal [Memory Remote] : remote memory was not impaired [Dysarthria] : no dysarthria [Aphasia] : no dysphasia/aphasia [Recall ___ / 3] : recall [unfilled] / 3 [Cranial Nerves Optic (II)] : visual acuity intact bilaterally,  visual fields full to confrontation, pupils equal round and reactive to light [Cranial Nerves Oculomotor (III)] : extraocular motion intact [Cranial Nerves Trigeminal (V)] : facial sensation intact symmetrically [Cranial Nerves Facial (VII)] : face symmetrical [Cranial Nerves Vestibulocochlear (VIII)] : hearing was intact bilaterally [Cranial Nerves Glossopharyngeal (IX)] : tongue and palate midline [Cranial Nerves Accessory (XI - Cranial And Spinal)] : head turning and shoulder shrug symmetric [Cranial Nerves Hypoglossal (XII)] : there was no tongue deviation with protrusion [Motor Tone] : muscle tone was normal in all four extremities [Motor Strength] : muscle strength was normal in all four extremities [Sensation Tactile Decrease] : light touch was intact [Sensation Pain / Temperature Decrease] : pain and temperature was intact [Sensation Vibration Decrease] : vibration was intact [Romberg's Sign] : Romberg's sign was negtive [Abnormal Walk] : normal gait [Coordination - Dysmetria Impaired Finger-to-Nose Bilateral] : not present [2+] : Ankle jerk left 2+ [Plantar Reflex Right Only] : normal on the right [Plantar Reflex Left Only] : normal on the left [Optic Disc Abnormality] : the optic disc were normal in size and color

## 2024-04-16 NOTE — HISTORY OF PRESENT ILLNESS
[FreeTextEntry1] : I saw this patient in the office today.  As you recall, on routine physical examination done by a visiting nurse, a cognitive screen demonstrated some memory difficulty. She was advised to follow with her primary doctor and then was referred here. Her  reports that he first noticed some cognitive difficulties in February of 2021. It is predominantly for short-term memory.  I had started her on memantine.  10/16/2023 visit: She presents with her . She has remained stable since her last visit.  4/16/2024 visit:

## 2024-05-24 ENCOUNTER — RX RENEWAL (OUTPATIENT)
Age: 79
End: 2024-05-24

## 2024-05-24 RX ORDER — MEMANTINE HYDROCHLORIDE 10 MG/1
10 TABLET, FILM COATED ORAL
Qty: 180 | Refills: 1 | Status: ACTIVE | COMMUNITY
Start: 2021-12-07 | End: 1900-01-01

## 2024-10-04 ENCOUNTER — OFFICE (OUTPATIENT)
Dept: URBAN - METROPOLITAN AREA CLINIC 115 | Facility: CLINIC | Age: 79
Setting detail: OPHTHALMOLOGY
End: 2024-10-04
Payer: MEDICARE

## 2024-10-04 DIAGNOSIS — H11.32: ICD-10-CM

## 2024-10-04 PROCEDURE — 99212 OFFICE O/P EST SF 10 MIN: CPT | Performed by: OPTOMETRIST

## 2024-10-04 ASSESSMENT — REFRACTION_MANIFEST
OD_SPHERE: +0.25
OD_AXIS: 000
OS_AXIS: 110
OD_CYLINDER: 0.00
OS_SPHERE: +0.25
OS_CYLINDER: -0.50
OS_VA1: 20/20
OD_VA1: 20/20

## 2024-10-04 ASSESSMENT — REFRACTION_AUTOREFRACTION
OD_AXIS: 000
OD_CYLINDER: 0.00
OS_AXIS: 102
OD_SPHERE: DEFER
OS_CYLINDER: -0.75
OS_SPHERE: DEFER

## 2024-10-04 ASSESSMENT — REFRACTION_CURRENTRX
OD_SPHERE: -1.00
OS_OVR_VA: 20/
OS_SPHERE: -1.75
OD_CYLINDER: -0.75
OS_AXIS: 095
OD_AXIS: 102
OS_CYLINDER: -0.75
OS_VPRISM_DIRECTION: SV
OD_VPRISM_DIRECTION: SV
OD_OVR_VA: 20/

## 2024-10-04 ASSESSMENT — CONFRONTATIONAL VISUAL FIELD TEST (CVF)
OS_FINDINGS: FULL
OD_FINDINGS: FULL

## 2024-10-04 ASSESSMENT — SUPERFICIAL PUNCTATE KERATITIS (SPK)
OS_SPK: T
OD_SPK: T

## 2024-10-04 ASSESSMENT — VISUAL ACUITY
OS_BCVA: 20/25
OD_BCVA: 20/25-1

## 2024-10-21 ENCOUNTER — APPOINTMENT (OUTPATIENT)
Dept: NEUROLOGY | Facility: CLINIC | Age: 79
End: 2024-10-21
Payer: MEDICARE

## 2024-10-21 VITALS
SYSTOLIC BLOOD PRESSURE: 216 MMHG | DIASTOLIC BLOOD PRESSURE: 73 MMHG | HEIGHT: 64 IN | WEIGHT: 135 LBS | HEART RATE: 85 BPM | BODY MASS INDEX: 23.05 KG/M2

## 2024-10-21 DIAGNOSIS — G30.9 ALZHEIMER'S DISEASE, UNSPECIFIED: ICD-10-CM

## 2024-10-21 DIAGNOSIS — F02.80 ALZHEIMER'S DISEASE, UNSPECIFIED: ICD-10-CM

## 2024-10-21 PROCEDURE — G2211 COMPLEX E/M VISIT ADD ON: CPT

## 2024-10-21 PROCEDURE — 99213 OFFICE O/P EST LOW 20 MIN: CPT

## 2024-12-25 PROBLEM — F10.90 ALCOHOL USE: Status: ACTIVE | Noted: 2021-12-07

## 2025-01-21 ENCOUNTER — OFFICE (OUTPATIENT)
Dept: URBAN - METROPOLITAN AREA CLINIC 115 | Facility: CLINIC | Age: 80
Setting detail: OPHTHALMOLOGY
End: 2025-01-21
Payer: MEDICARE

## 2025-01-21 DIAGNOSIS — H43.813: ICD-10-CM

## 2025-01-21 DIAGNOSIS — H35.40: ICD-10-CM

## 2025-01-21 DIAGNOSIS — Z96.1: ICD-10-CM

## 2025-01-21 DIAGNOSIS — H35.033: ICD-10-CM

## 2025-01-21 DIAGNOSIS — H16.223: ICD-10-CM

## 2025-01-21 PROCEDURE — 92014 COMPRE OPH EXAM EST PT 1/>: CPT | Performed by: OPHTHALMOLOGY

## 2025-01-21 PROCEDURE — 92250 FUNDUS PHOTOGRAPHY W/I&R: CPT | Performed by: OPHTHALMOLOGY

## 2025-01-21 ASSESSMENT — REFRACTION_CURRENTRX
OS_SPHERE: -1.75
OS_OVR_VA: 20/
OS_VPRISM_DIRECTION: SV
OS_CYLINDER: -0.75
OD_AXIS: 102
OD_SPHERE: -1.00
OS_AXIS: 095
OD_OVR_VA: 20/
OD_CYLINDER: -0.75
OD_VPRISM_DIRECTION: SV

## 2025-01-21 ASSESSMENT — REFRACTION_AUTOREFRACTION
OS_AXIS: 096
OD_AXIS: 042
OD_SPHERE: +0.75
OS_SPHERE: +0.50
OS_CYLINDER: -0.50
OD_CYLINDER: -0.25

## 2025-01-21 ASSESSMENT — TONOMETRY
OS_IOP_MMHG: 19
OD_IOP_MMHG: 17

## 2025-01-21 ASSESSMENT — REFRACTION_MANIFEST
OS_AXIS: 110
OD_AXIS: 000
OS_VA1: 20/20
OD_VA1: 20/20
OS_SPHERE: +0.25
OD_CYLINDER: 0.00
OD_SPHERE: +0.25
OS_CYLINDER: -0.50

## 2025-01-21 ASSESSMENT — SUPERFICIAL PUNCTATE KERATITIS (SPK)
OD_SPK: T
OS_SPK: T

## 2025-01-21 ASSESSMENT — VISUAL ACUITY
OS_BCVA: 20/25
OD_BCVA: 20/20

## 2025-01-21 ASSESSMENT — CONFRONTATIONAL VISUAL FIELD TEST (CVF)
OS_FINDINGS: FULL
OD_FINDINGS: FULL

## 2025-06-01 NOTE — ED ADULT NURSE NOTE - NSICDXPASTMEDICALHX_GEN_ALL_CORE_FT
You presented with reports of heart palpitations, tiredness, and dizziness. Your electrocardiogram results did not show any signs of cardiac ischemia. Your electrolytes including magnesium where all normal. We do recommend to you speak to your physician about anxiety management as well as cutting back on alcohol use-alcohol use can worsen these symptoms. Please follow up with your primary care provider within one week.   PAST MEDICAL HISTORY:  HTN (hypertension)

## 2025-06-02 ENCOUNTER — APPOINTMENT (OUTPATIENT)
Dept: NEUROLOGY | Facility: CLINIC | Age: 80
End: 2025-06-02
Payer: MEDICARE

## 2025-06-02 VITALS
BODY MASS INDEX: 21.34 KG/M2 | DIASTOLIC BLOOD PRESSURE: 80 MMHG | SYSTOLIC BLOOD PRESSURE: 182 MMHG | WEIGHT: 125 LBS | HEIGHT: 64 IN

## 2025-06-02 DIAGNOSIS — G30.9 ALZHEIMER'S DISEASE, UNSPECIFIED: ICD-10-CM

## 2025-06-02 DIAGNOSIS — F02.80 ALZHEIMER'S DISEASE, UNSPECIFIED: ICD-10-CM

## 2025-06-02 PROCEDURE — G2211 COMPLEX E/M VISIT ADD ON: CPT

## 2025-06-02 PROCEDURE — 99213 OFFICE O/P EST LOW 20 MIN: CPT
